# Patient Record
Sex: MALE | Race: WHITE | HISPANIC OR LATINO
[De-identification: names, ages, dates, MRNs, and addresses within clinical notes are randomized per-mention and may not be internally consistent; named-entity substitution may affect disease eponyms.]

---
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
## 2024-08-13 ENCOUNTER — APPOINTMENT (OUTPATIENT)
Dept: UROLOGY | Facility: CLINIC | Age: 19
End: 2024-08-13
Payer: COMMERCIAL

## 2024-08-13 VITALS
HEART RATE: 62 BPM | HEIGHT: 68 IN | SYSTOLIC BLOOD PRESSURE: 129 MMHG | RESPIRATION RATE: 16 BRPM | WEIGHT: 170 LBS | DIASTOLIC BLOOD PRESSURE: 77 MMHG | OXYGEN SATURATION: 99 % | BODY MASS INDEX: 25.76 KG/M2

## 2024-08-13 DIAGNOSIS — Z80.1 FAMILY HISTORY OF MALIGNANT NEOPLASM OF TRACHEA, BRONCHUS AND LUNG: ICD-10-CM

## 2024-08-13 DIAGNOSIS — Z80.0 FAMILY HISTORY OF MALIGNANT NEOPLASM OF DIGESTIVE ORGANS: ICD-10-CM

## 2024-08-13 DIAGNOSIS — Z80.3 FAMILY HISTORY OF MALIGNANT NEOPLASM OF BREAST: ICD-10-CM

## 2024-08-13 DIAGNOSIS — Z78.9 OTHER SPECIFIED HEALTH STATUS: ICD-10-CM

## 2024-08-13 LAB
BILIRUB UR QL STRIP: NORMAL
CLARITY UR: CLEAR
COLLECTION METHOD: NORMAL
GLUCOSE UR-MCNC: NORMAL
HCG UR QL: 0.2 EU/DL
HGB UR QL STRIP.AUTO: NORMAL
KETONES UR-MCNC: NORMAL
LEUKOCYTE ESTERASE UR QL STRIP: NORMAL
NITRITE UR QL STRIP: NORMAL
PH UR STRIP: 6
PROT UR STRIP-MCNC: NORMAL
SP GR UR STRIP: 1.01

## 2024-08-13 PROCEDURE — 99203 OFFICE O/P NEW LOW 30 MIN: CPT

## 2024-08-13 PROCEDURE — 81003 URINALYSIS AUTO W/O SCOPE: CPT | Mod: QW

## 2024-08-13 NOTE — HISTORY OF PRESENT ILLNESS
[FreeTextEntry1] : Pt with c/o intermittent gross hematuria x 1 month. One week ago he noted blood in his urine for several days. Sometimes small amount, other times large amounts. HE noted blood in his urine again this morning and went to his PCP again. He was advised to follow up with Urology.  Denies frequency, urgency, dysuria, slow stream, discharge, back pain, abdominal pressure He states he is not sexually active, he does masturbate, does not place objects into urethra

## 2024-08-13 NOTE — PHYSICAL EXAM
[General Appearance - Well Developed] : well developed [General Appearance - Well Nourished] : well nourished [Normal Appearance] : normal appearance [Well Groomed] : well groomed [General Appearance - In No Acute Distress] : no acute distress [Edema] : no peripheral edema [Respiration, Rhythm And Depth] : normal respiratory rhythm and effort [Exaggerated Use Of Accessory Muscles For Inspiration] : no accessory muscle use [Abdomen Soft] : soft [Abdomen Tenderness] : non-tender [Abdomen Hernia] : no hernia was discovered [Urethral Meatus] : meatus normal [Penis Abnormality] : normal circumcised penis [Urinary Bladder Findings] : the bladder was normal on palpation [Scrotum] : the scrotum was normal [Epididymis] : the epididymides were normal [Testes Tenderness] : no tenderness of the testes [Testes Mass (___cm)] : there were no testicular masses [Normal Station and Gait] : the gait and station were normal for the patient's age [Skin Color & Pigmentation] : normal skin color and pigmentation [] : no rash [No Focal Deficits] : no focal deficits [Oriented To Time, Place, And Person] : oriented to person, place, and time [Affect] : the affect was normal [Mood] : the mood was normal [Not Anxious] : not anxious [Inguinal Lymph Nodes Enlarged Bilaterally] : inguinal

## 2024-08-13 NOTE — ASSESSMENT
[FreeTextEntry1] : Gross hematuria  Asymptomatic Nonsmoker Urine dip neg RTO for Cysto to complete hematuria workup   Records reviewed:  7/30/24 renal US: unremarkable, prostate 18 ml Creatinine 0.97 mg/dL, GC/chlamydia neg, RPR neg, urine culture neg 7/22/24

## 2024-08-19 ENCOUNTER — APPOINTMENT (OUTPATIENT)
Dept: UROLOGY | Facility: CLINIC | Age: 19
End: 2024-08-19
Payer: COMMERCIAL

## 2024-08-19 VITALS
HEIGHT: 68 IN | DIASTOLIC BLOOD PRESSURE: 74 MMHG | SYSTOLIC BLOOD PRESSURE: 134 MMHG | WEIGHT: 170 LBS | BODY MASS INDEX: 25.76 KG/M2

## 2024-08-19 DIAGNOSIS — R31.0 GROSS HEMATURIA: ICD-10-CM

## 2024-08-19 LAB
BILIRUB UR QL STRIP: NORMAL
CLARITY UR: CLEAR
COLLECTION METHOD: NORMAL
GLUCOSE UR-MCNC: NORMAL
HCG UR QL: 0.2 EU/DL
HGB UR QL STRIP.AUTO: ABNORMAL
KETONES UR-MCNC: NORMAL
LEUKOCYTE ESTERASE UR QL STRIP: NORMAL
NITRITE UR QL STRIP: NORMAL
PH UR STRIP: 5.5
PROT UR STRIP-MCNC: NORMAL
SP GR UR STRIP: >1.03

## 2024-08-19 PROCEDURE — 81003 URINALYSIS AUTO W/O SCOPE: CPT | Mod: QW

## 2024-08-19 PROCEDURE — 52000 CYSTOURETHROSCOPY: CPT

## 2024-08-29 ENCOUNTER — OUTPATIENT (OUTPATIENT)
Dept: OUTPATIENT SERVICES | Facility: HOSPITAL | Age: 19
LOS: 1 days | End: 2024-08-29
Payer: COMMERCIAL

## 2024-08-29 ENCOUNTER — APPOINTMENT (OUTPATIENT)
Dept: CT IMAGING | Facility: CLINIC | Age: 19
End: 2024-08-29

## 2024-08-29 DIAGNOSIS — R31.0 GROSS HEMATURIA: ICD-10-CM

## 2024-08-29 PROCEDURE — 74178 CT ABD&PLV WO CNTR FLWD CNTR: CPT

## 2024-08-29 PROCEDURE — 74178 CT ABD&PLV WO CNTR FLWD CNTR: CPT | Mod: 26

## 2024-09-10 ENCOUNTER — APPOINTMENT (OUTPATIENT)
Dept: UROLOGY | Facility: CLINIC | Age: 19
End: 2024-09-10

## 2024-09-26 ENCOUNTER — APPOINTMENT (OUTPATIENT)
Dept: UROLOGY | Facility: CLINIC | Age: 19
End: 2024-09-26
Payer: COMMERCIAL

## 2024-09-26 PROCEDURE — 99213 OFFICE O/P EST LOW 20 MIN: CPT

## 2024-09-26 NOTE — HISTORY OF PRESENT ILLNESS
[FreeTextEntry1] : Patient has hematuria.  Negative renal ultrasound. no abdominal pain or flank pain or back pain. no stones.  no urgency or frequency.  Cysto negative as well.

## 2024-09-26 NOTE — LETTER BODY
[Dear  ___] : Dear  [unfilled], [Courtesy Letter:] : I had the pleasure of seeing your patient, [unfilled], in my office today. [Please see my note below.] : Please see my note below. [Sincerely,] : Sincerely, [FreeTextEntry3] : Ed  Casper King MD [facility]  [facility] [place] [facility] [employer] [facility]

## 2024-09-26 NOTE — ASSESSMENT
[FreeTextEntry1] : Impression:  gross hematuria,  no  etiology identified.   Recommended nephrology work up.   follow up with us PRN

## 2024-11-14 ENCOUNTER — EMERGENCY (EMERGENCY)
Facility: HOSPITAL | Age: 19
LOS: 0 days | Discharge: HOME | End: 2024-11-14
Attending: EMERGENCY MEDICINE
Payer: COMMERCIAL

## 2024-11-14 VITALS
OXYGEN SATURATION: 97 % | RESPIRATION RATE: 18 BRPM | DIASTOLIC BLOOD PRESSURE: 64 MMHG | SYSTOLIC BLOOD PRESSURE: 130 MMHG | HEART RATE: 61 BPM | TEMPERATURE: 99 F

## 2024-11-14 VITALS
OXYGEN SATURATION: 98 % | TEMPERATURE: 98 F | HEART RATE: 58 BPM | RESPIRATION RATE: 17 BRPM | DIASTOLIC BLOOD PRESSURE: 73 MMHG | SYSTOLIC BLOOD PRESSURE: 121 MMHG

## 2024-11-14 DIAGNOSIS — H61.21 IMPACTED CERUMEN, RIGHT EAR: ICD-10-CM

## 2024-11-14 PROCEDURE — 99283 EMERGENCY DEPT VISIT LOW MDM: CPT

## 2024-11-14 PROCEDURE — 69209 REMOVE IMPACTED EAR WAX UNI: CPT

## 2024-11-14 PROCEDURE — 99283 EMERGENCY DEPT VISIT LOW MDM: CPT | Mod: 25

## 2024-11-14 RX ORDER — CIPROFLOXACIN AND DEXAMETHASONE 3; 1 MG/ML; MG/ML
4 SUSPENSION/ DROPS AURICULAR (OTIC)
Qty: 1 | Refills: 0
Start: 2024-11-14 | End: 2024-11-20

## 2024-11-14 NOTE — ED STATDOCS - PATIENT PORTAL LINK FT
You can access the FollowMyHealth Patient Portal offered by [facility] by registering at the following website: [email or web address]. By joining [employer]’s FollowMyHealth portal, you will also be able to view your health information using other applications (apps) compatible with our system.

## 2024-11-14 NOTE — ED ADULT TRIAGE NOTE - CHIEF COMPLAINT QUOTE
Pt A&OX3, presenting to the ER with c/o ear pain. Pt reports having right ear pain since yesterday. Feels like it is clogged, having trouble hearing out of the ear.  Pt denies drainage from ear, fevers, nausea, vomiting.

## 2024-11-14 NOTE — ED STATDOCS - PROGRESS NOTE DETAILS
Cerumen disimpacted via irrigation with warm water/hydrogen peroxide.  Patient tolerated procedure well.  Ear canal mildly erythematous, will discharge home with otic antibiotic eardrops, follow-up with PCP/ENT.  Educated to continue Debrox and refrain from using Q-tips.  Strict return precautions to ED were discussed.  All questions and concerns were addressed. - Cammy Barrera PA-C

## 2024-11-14 NOTE — ED STATDOCS - OBJECTIVE STATEMENT
19-year-old male without significant past medical history presents with right ear fullness sensation.  Patient states he tried to clean the earwax and thinks he pushed it in [place].  Patient tried Debrox drops without relief.  No fevers or chills.  No recent illness.

## 2024-11-14 NOTE — ED STATDOCS - PHYSICAL EXAMINATION
Gen: Well appearing in NAD  Head: NC/AT  ENT: Right ear cerumen impaction  Neck: Trachea midline  Resp: No distress  Ext: No deformities  Neuro: A&O appears non focal  Skin: Warm and dry as visualized  Psych: Normal affect and mood

## 2024-11-14 NOTE — ED STATDOCS - NSFOLLOWUPINSTRUCTIONS_ED_ALL_ED_FT
Follow up with PCP/ENT.  Continue to use Debrox, refrain from using Q-tips.  Use ear drops as prescribed.  Please return to the ED for new, worsening, or concerning symptoms.    Earwax Buildup, Adult  Your ears make something called earwax. It helps keep germs called bacteria away and protects the skin in your ears. Sometimes, too much earwax can build up. This can cause discomfort or make it harder to hear.    What are the causes?  Earwax buildup can happen when you have too much earwax in your ears. Earwax is made in the outer part of your ear canal. It's supposed to fall out in small amounts over time.    But if your ears aren't able to clean themselves like they should, earwax can build up.    What increases the risk?  You're more likely to get earwax buildup if:  You clean your ears with cotton swabs.  You pick at your ears.  You use earplugs or in-ear headphones a lot.  You wear hearing aids.  You may also be more likely to get it if:  You're male.  You're older.  Your ears naturally make more earwax.  You have narrow ear canals or extra hair in your ears.  Your earwax is too thick or sticky.  You have eczema.  You're dehydrated. This means there's not enough fluid in your body.  What are the signs or symptoms?  Symptoms of earwax buildup include:  Not being able to hear as well.  A feeling of fullness in your ear.  Feeling like your ear is plugged.  Fluid coming from your ear.  Ear pain or an itchy ear.  Ringing in your ear.  Coughing or problems with balance.  How is this diagnosed?  Earwax buildup may be diagnosed based on your symptoms, medical history, and an ear exam. During the exam, your health care provider will look into your ear with a tool called an otoscope.    You may also have tests, such as a hearing test.    How is this treated?  Earwax buildup may be treated by:  Using ear drops.  Having the earwax removed by a provider. The provider may:  Flush the ear with water.  Use a tool called a curette that has a loop on the end.  Use a suction device.  Having surgery. This may be done in severe cases.  Follow these instructions at home:  A sign showing that a person should not use a cotton swab in the ear.  Cleaning your ears    Clean your ears as told by your provider. You can clean the outside of your ears with a washcloth or tissue.  Do not overclean your ears.  Do not put anything into your ear unless told. This includes cotton swabs.  General instructions    Take over-the-counter and prescription medicines only as told by your provider.  Drink enough fluid to keep your pee (urine) pale yellow. This helps thin the earwax.  If you have hearing aids, clean them as told.  Keep all follow-up visits. If earwax builds up in your ears often or if you use hearing aids, ask your provider how often you should have your ears cleaned.  Contact a health care provider if:  Your ear pain gets worse.  You have a fever.  You have pus, blood, or other fluid coming from your ear.  You have hearing loss.  You have ringing in your ears that won't go away.  You feel like the room is spinning. This is called vertigo.  Your symptoms don't get better with treatment.  This information is not intended to replace advice given to you by your health care provider. Make sure you discuss any questions you have with your health care provider.

## 2025-03-21 ENCOUNTER — EMERGENCY (EMERGENCY)
Facility: HOSPITAL | Age: 20
LOS: 1 days | Discharge: HOME | End: 2025-03-21
Attending: STUDENT IN AN ORGANIZED HEALTH CARE EDUCATION/TRAINING PROGRAM
Payer: COMMERCIAL

## 2025-03-21 VITALS
DIASTOLIC BLOOD PRESSURE: 72 MMHG | HEIGHT: 68 IN | OXYGEN SATURATION: 99 % | TEMPERATURE: 98 F | WEIGHT: 176.59 LBS | HEART RATE: 72 BPM | RESPIRATION RATE: 18 BRPM | SYSTOLIC BLOOD PRESSURE: 131 MMHG

## 2025-03-21 VITALS
TEMPERATURE: 98 F | RESPIRATION RATE: 18 BRPM | OXYGEN SATURATION: 100 % | DIASTOLIC BLOOD PRESSURE: 70 MMHG | SYSTOLIC BLOOD PRESSURE: 126 MMHG | HEART RATE: 58 BPM

## 2025-03-21 PROCEDURE — 99283 EMERGENCY DEPT VISIT LOW MDM: CPT

## 2025-03-21 NOTE — ED ADULT TRIAGE NOTE - CHIEF COMPLAINT QUOTE
pt walk in c/o hematuria x2 wks denies abd/ pelvic pain, nvd denies dysuria and mal-ordorous urine. States was seen 3mos ago for same complaints

## 2025-03-22 LAB
APPEARANCE UR: ABNORMAL
BACTERIA # UR AUTO: NEGATIVE /HPF — SIGNIFICANT CHANGE UP
BILIRUB UR-MCNC: NEGATIVE — SIGNIFICANT CHANGE UP
CAST: 0 /LPF — SIGNIFICANT CHANGE UP (ref 0–4)
COLOR SPEC: ABNORMAL
DIFF PNL FLD: ABNORMAL
GLUCOSE UR QL: NEGATIVE MG/DL — SIGNIFICANT CHANGE UP
KETONES UR-MCNC: NEGATIVE MG/DL — SIGNIFICANT CHANGE UP
LEUKOCYTE ESTERASE UR-ACNC: ABNORMAL
NITRITE UR-MCNC: NEGATIVE — SIGNIFICANT CHANGE UP
PH UR: 7 — SIGNIFICANT CHANGE UP (ref 5–8)
PROT UR-MCNC: 30 MG/DL
RBC CASTS # UR COMP ASSIST: 1897 /HPF — HIGH (ref 0–4)
SP GR SPEC: 1.03 — SIGNIFICANT CHANGE UP (ref 1–1.03)
SQUAMOUS # UR AUTO: 0 /HPF — SIGNIFICANT CHANGE UP (ref 0–5)
UROBILINOGEN FLD QL: 1 MG/DL — SIGNIFICANT CHANGE UP (ref 0.2–1)
WBC UR QL: 3 /HPF — SIGNIFICANT CHANGE UP (ref 0–5)

## 2025-03-22 PROCEDURE — 99283 EMERGENCY DEPT VISIT LOW MDM: CPT

## 2025-03-22 PROCEDURE — 87086 URINE CULTURE/COLONY COUNT: CPT

## 2025-03-22 PROCEDURE — 81001 URINALYSIS AUTO W/SCOPE: CPT

## 2025-03-22 NOTE — ED ADULT NURSE NOTE - NSFALLUNIVINTERV_ED_ALL_ED
Bed/Stretcher in lowest position, wheels locked, appropriate side rails in place/Call bell, personal items and telephone in reach/Instruct patient to call for assistance before getting out of bed/chair/stretcher/Non-slip footwear applied when patient is off stretcher/Effort to call system/Physically safe environment - no spills, clutter or unnecessary equipment/Purposeful proactive rounding/Room/bathroom lighting operational, light cord in reach

## 2025-03-22 NOTE — ED PROVIDER NOTE - IV ALTEPLASE ADMIN OUTSIDE HIDDEN
Ambulatory Care Coordination Note     7/10/2024 3:41 PM     Patient outreach attempt by this [facility] today to offer care management services. [facility] was unable to reach the patient by telephone today; left voice message requesting a return phone call to this ACM.     ACM: Savanna Rowell RN    Care Summary Note:     PCP referral to CC and RPM    PCP/Specialist follow up:   Future Appointments         Provider Specialty Dept Phone    7/24/2024 4:30 PM (Arrive by 4:00 PM) [facility] CT RM 1 Radiology [phone]    7/24/2024 5:00 PM (Arrive by 4:15 PM) [facility] MRI RM 1 Radiology [phone]    8/7/2024 9:00 AM GEENA Scott MD Internal Medicine [phone]    9/20/2024 10:30 AM (Arrive by 10:00 AM) [facility] CT RM 1 Radiology [phone]    9/30/2024 1:15 PM Chay Santacruz MD Pulmonology [phone]            Follow Up:   Plan for next [facility] outreach in approximately 2 weeks to complete:  - outreach attempt to offer care management services  - RPM.            show

## 2025-03-22 NOTE — ED PROVIDER NOTE - PATIENT PORTAL LINK FT
You can access the FollowMyHealth Patient Portal offered by [facility] by registering at the following website: [email or web address]. By joining [employer]’s FollowMyHealth portal, you will also be able to view your health information using other applications (apps) compatible with our system.

## 2025-03-22 NOTE — ED PROVIDER NOTE - CLINICAL SUMMARY MEDICAL DECISION MAKING FREE TEXT BOX
19 year old male PMHx gross hematuria present to ED for hematuria. Pt reports 3 weeks of painless bloody urine. pt state he had similar symptoms in september 2024 with negative CTAP, Cytoscopy. pt was advised to follow up with nephrology at that time but never did as symptoms resolved on own. Denies recent viral symptoms, fever, chills, sweats, flank pain, dysuria, urgency, frequency,  UA, re-assess 19 year old male PMHx gross hematuria present to ED for hematuria. Pt reports 3 weeks of painless bloody urine. pt state he had similar symptoms in september 2024 with negative CTAP, Cytoscopy. pt was advised to follow up with nephrology at that time but never did as symptoms resolved on own. Denies recent viral symptoms, fever, chills, sweats, flank pain, dysuria, urgency, frequency,  UA, re-assess  UA with +RBC, pt without abd pain, urgency, frequency.   Pt reassessed, pt feeling better at this time, vss, pt able to walk, talk and vocalized plan of action. Discussed in depth and explained to pt in depth the next steps that need to be taken including proper follow up with PCP or specialists. All incidental findings were discussed with pt as well. Pt verbalized their concerns and all questions were answered. Pt understands dispo and wants discharge. Given good instructions when to return to ED, strict return precautions and importance of f/u.

## 2025-03-22 NOTE — ED PROVIDER NOTE - OBJECTIVE STATEMENT
19 year old male PMHx gross hematuria present to ED for hematuria. Pt reports 3 weeks of painless bloody urine. pt state he had similar symptoms in september 2024 with negative CTAP, Cytoscopy. pt was advised to follow up with nephrology at that time but never did as symptoms resolved on own. Denies recent viral symptoms, fever, chills, sweats, flank pain, dysuria, urgency, frequency,

## 2025-03-22 NOTE — ED PROVIDER NOTE - ATTENDING APP SHARED VISIT CONTRIBUTION OF CARE
19 year old male PMHx  hematuria present to ED for hematuria for 3 weeks. Denies pain or burning on urination. Denies difficulty urinating. Reports similar symptoms last year and had workup with negative CT and cytoscopy, was recommended to follow with nephro but never followed up. Denies fever, flank pain, testicular pain, diarrhea 19 year old male PMHx  hematuria present to ED for hematuria for 3 weeks. Denies pain or burning on urination. Denies difficulty urinating. Reports similar symptoms last year and had workup with negative CT and cytoscopy, was recommended to follow with nephro but never followed up. Denies fever, flank pain, testicular pain, diarrhea  AP- well appearing, nonfocal exam. well appearing will check urine here, given recent workup, recommending outpt f/u

## 2025-03-22 NOTE — ED PROVIDER NOTE - CARE PROVIDER_API CALL
Charlette Galvan  Nephrology  [address], [place] [address]  Phone: [phone]  Fax: [phone]  Follow Up Time:

## 2025-03-22 NOTE — ED PROVIDER NOTE - PHYSICAL EXAMINATION
Gen: No acute distress, non toxic  HEENT: Mucous membranes moist, pink conjunctivae, EOMI  CV: RRR, nl s1/s2.  Resp: CTAB, normal rate and effort  GI: Abdomen soft, NT, ND. No rebound, no guarding  : No CVAT  Neuro: A&O x 3, moving all 4 extremities  MSK: No obvious  Skin: No rashes. intact and perfused.

## 2025-03-22 NOTE — ED ADULT NURSE NOTE - OBJECTIVE STATEMENT
Pt presents to ED c/o hematuria for 3 weeks. Pt denies any pain. Pt states has similar symptoms in September '24 with negative results. Pt was advised to follow up with nephro but never did, states symptoms resolved. Pt is resting in stretcher comfortably at this time, no apparent distress noted at this time. Pt safety maintained. Pt denies any complaints at this time.

## 2025-03-23 LAB
CULTURE RESULTS: NO GROWTH — SIGNIFICANT CHANGE UP
SPECIMEN SOURCE: SIGNIFICANT CHANGE UP